# Patient Record
Sex: FEMALE | Employment: OTHER | URBAN - METROPOLITAN AREA
[De-identification: names, ages, dates, MRNs, and addresses within clinical notes are randomized per-mention and may not be internally consistent; named-entity substitution may affect disease eponyms.]

---

## 2018-09-24 ENCOUNTER — HOSPITAL ENCOUNTER (OUTPATIENT)
Dept: MAMMOGRAPHY | Age: 58
Discharge: HOME | End: 2018-09-24
Payer: COMMERCIAL

## 2018-09-24 DIAGNOSIS — Z12.39 SCREENING BREAST EXAMINATION: ICD-10-CM

## 2018-09-24 PROCEDURE — 77067 SCR MAMMO BI INCL CAD: CPT

## 2019-09-25 ENCOUNTER — HOSPITAL ENCOUNTER (OUTPATIENT)
Dept: MAMMOGRAPHY | Age: 59
Discharge: HOME | End: 2019-09-25
Payer: COMMERCIAL

## 2019-09-25 DIAGNOSIS — Z12.31 VISIT FOR SCREENING MAMMOGRAM: ICD-10-CM

## 2019-09-25 PROCEDURE — 77067 SCR MAMMO BI INCL CAD: CPT

## 2019-10-09 ENCOUNTER — HOSPITAL ENCOUNTER (OUTPATIENT)
Dept: MAMMOGRAPHY | Age: 59
Discharge: HOME | End: 2019-10-09

## 2019-10-09 DIAGNOSIS — R92.2 INCONCLUSIVE MAMMOGRAM: ICD-10-CM

## 2020-01-16 ENCOUNTER — HOSPITAL ENCOUNTER (OUTPATIENT)
Dept: PHYSICAL THERAPY | Age: 60
Discharge: HOME | End: 2020-01-16
Payer: COMMERCIAL

## 2020-01-16 PROCEDURE — 97161 PT EVAL LOW COMPLEX 20 MIN: CPT | Performed by: PHYSICAL THERAPIST

## 2020-01-16 NOTE — PROGRESS NOTES
[facility]  [address]  [place], [facility]  Phone: [phone]  Fax: [phone]    Plan of Care/Statement of Necessity for Physical Therapy Services  2-15    Patient name: Gigi Carroll  : 1960  Provider#: [id number]  Referral source: Jordana Quintero MD      Medical/Treatment Diagnosis: Right hip pain [M25.551]  Left hip pain [M25.552]  Pain in right knee [M25.561]  Left knee pain [M25.562]     Prior Hospitalization: see medical history     Comorbidities: see evaluation  Prior Level of Function:see evaluation  Medications: Verified on Patient Summary List  Start of Care: 2020     Onset Date:see evaluation   The Plan of Care and following information is based on the information from the initial evaluation. Assessment/ key information: Patient presents with signs and symptoms consistent with (R) lumbar radiculopathy + (R) hip impingement + francie knee OA and will benefit from physical therapy to address deficits noted below in problem list.     Problem List: pain affecting function, decrease ROM, decrease strength, edema affecting function, impaired gait/ balance, decrease ADL/ functional abilitiies and decrease flexibility/ joint mobility   Treatment Plan may include any combination of the following: Therapeutic exercise, Therapeutic activities, Neuromuscular re-education, Physical agent/modality, Manual therapy, Patient education and Self Care training  Patient / Family readiness to learn indicated by: asking questions, trying to perform skills and interest  Persons(s) to be included in education: patient (P)  Barriers to Learning/Limitations: None  Patient Goal (s): please see evaluation in [facility]  Patient Self Reported Health Status: please see paper chart  Rehabilitation Potential: excellent    Short Term Goals:  To be accomplished in 5 treatments:  -Independent in HEP as evidenced on ability to perform at least 5 exercises from HEP using proper form without verbal cuing.   -Pt will report knee swelling is 50% improved to allow pt to negotiate stairs with greater ease. -Pt will report she is compliant with cross training doing cardio/strength in conjunction with yoga 1-2 days weekly  -Pt will report compliance with icing 1-2x/day in order to decrease inflammation    Long Term Goals: To be accomplished in 10 treatments:  -AROM lumbar spine WNL and pain-free all planes to allow pt to perform household cleaning without pain  -Neg magdalena (R) hip to allow pt to resume all yoga poses without pain  -Swelling eliminated francie knees to allow pt to trial skiing.  -MMT greater than or equal to 4+/5 all planes to allow patient to perform ADL's  -Pain 0-2/10 to allow pt to help parents with household projects without pain    Frequency / Duration: Patient to be seen 2 times per week for 10-12 weeks. Patient/ Caregiver education and instruction: self care, activity modification and exercises    [x]  Plan of care has been reviewed with PTA    Certification Period: 1/16/2020 -  4/16/20    Beata Velázquez. Fei, PT, DPT, CMTPT      8/36/5044 18:93 AM  PT License Number: [id number]  _____________________________________________________________________    I certify that the above Therapy Services are being furnished while the patient is under my care. I agree with the treatment plan and certify that this therapy is necessary.     [de-identified] Signature:____________________  Date:____________Time:_________

## 2020-01-16 NOTE — PROGRESS NOTES
PT INITIAL EVALUATION NOTE - [facility] 2-15    Patient Name: Alexis Gonzalez  Date:2020  : 1960  [x]  Patient  Verified  Payor: [employer] / Plan: [place] [employer] / Product Type: HMO /    In time:955 A  Out time:1055 A  Total Treatment Time (min): 0 (eval only)  Visit #:1    Treatment Area: Right hip pain [M25.551]  Left hip pain [M25.552]  Pain in right knee [M25.561]  Left knee pain [M25.562]    SUBJECTIVE  Any medication changes, allergies to medications, adverse drug reactions, diagnosis change, or new procedure performed?: [] No    [x] Yes (see summary sheet for update)  Date of onset/injury: Pt with onset of francie knee pain that occurred about 1 year ago when pt was skiiing. She noticed increased swelling and pain in her right knee  After time passed, pain moved to her left knee  Then the right side of her back and francie hips started bothering her  Pt has been trying to stretch it out in yoga, but it just continues to limit her  Over the past year, her yoga practice has been more \"forward focused\" engaging core and hip flexors more, so she wonders if this is causing her to be more forward in her posture and overusing her knees. At times feels like francie knees are giving out on her. Have not given out on her   Pain:   4/10 max 0/10 min 2/10 now     Location of symptoms: right low back, francie ant aspect of hips, right ant knee, left ant knee  Description of symptoms: burning  Aggravated by: stairs, bending over, sleeping  Eased by: nothing  Prior tests/injections:none  PMH:  WNL  Any weakness in LE's: none  Any tingling/numbness in LE's: none  Recent weight/loss or weight gain: none  Prior tx: PT for LBP and hip pain  Occupation: retired  Social: has a partner, Mel Metcalf, who she lives with. Has two parents that live nearby that she helps with. Both Elderly 96 and 99. Prior level of function/activity level: very active. Does yoga 1 hr daily. Prior to injury, was able to do all yoga poses without limitation.   Was able to go up and down the stairs without pain and ski without pain  Patient goal: \"strength, stability, no surgical procedures\"    OBJECTIVE    Observation:  Lumbar shift  [] (L)   [x] (R)  Lordosis   [] Inc    [x] Dec  Pelvic symmetry  [] WNL  [x] Other: left posterior rotation illium    AROM  WNL unless noted below   % decreased   Flexion  WNL   Extension  10% pain right   Right Sidebending 10% pain right   Left Sidebending 10%   Right Rotation 10% pain right   Left Rotation better     AROM/PROM fidel hips WNL    AROM/PROM fidel knees WNL    Strength  *5/5 unless noted below    L(0-5) R (0-5)   Hip Flexion (L1,2)     Knee Extension (L3,4)     Knee Flexion (S1,2)     Ankle Dorsiflexion (L4)     Great Toe Extension (L5)     Ankle Plantarflexion (S1)     Ankle Eversion (S1)     Lower Abdominals 4 4   Paraspinals 4 4   Hip Extensors 4 4   Hip Abductors 4 4   Hip ER's 4 4   Hip IR's 4 4     Tenderness to palpation: (R) lumbar paraspinals, fidel glute max, glute med. Fidel vastus lateralis, VMO. Special Tests: (R) shift correction - right arm on wall (L) SB x 10 decreases pain with lumbar extension, improves lumbar extension    (R) hip (+) MICHAEL    All testing for fidel knees neg for meniscal and ligamentous pathology    fidel hips (-) compression, testing both hips improve symptoms    Flexibility: WNL    Joint mobility:  Hypomobility L1-L5 no pain with testing. Hypomobility noted femoral post glide fidel.    *OP of both feel better     Edema: mod noted right knee, min noted left knee       OBJECTIVE  [x] Skin assessment post-treatment:  [x]intact []redness- no adverse reaction    []redness  adverse reaction:      With   [x] eval   [] manual   [] self care    Patient Education: [x] Review HEP    [] Progressed/Changed HEP based on:   [] positioning   [] body mechanics   [] transfers   [x] Ice application- pt advised to ice 10-15 min 1-2 x/day to area in order to dec inflammation  [x] other:  re: mechanism of injury/condition, role of physical therapy, prognosis for recovery, heat vs ice, activity modifications. Education about disc mechanics using spine model. Pt to perform 10 repetitions of right arm on wall left SB q 1-2 hours. Pt advised re: centralization vs peripheralization. If symptoms centralize, pt to continue, if symptoms peripheralize, pt to stop. Pt educated on mechanics of spine. Pt advised to use lumbar roll for improved sitting posture. Discussion re: proper computer ergonomic set-up. Discussion re: safe transfer from supine to sit. Pt advised to add in prone press ups in a few days if feeling better. Pain Level (0-10 scale) post treatment: 2    ASSESSMENT/Changes in Function:     [x]  See [facility].  Fei PT, DPT, LEONARDAPT  PT License Number: [id number]   1/16/2020  10:07 AM

## 2020-01-22 ENCOUNTER — APPOINTMENT (OUTPATIENT)
Dept: PHYSICAL THERAPY | Age: 60
End: 2020-01-22

## 2020-01-22 ENCOUNTER — HOSPITAL ENCOUNTER (OUTPATIENT)
Dept: PHYSICAL THERAPY | Age: 60
Discharge: HOME | End: 2020-01-22
Payer: COMMERCIAL

## 2020-01-22 PROCEDURE — 20561 NDL INSJ W/O NJX 3+ MUSC: CPT | Performed by: PHYSICAL THERAPIST

## 2020-01-22 NOTE — PROGRESS NOTES
PHYSICAL THERAPY -DRY NEEDLING  DAILY TREATMENT NOTE    Patient Name: Bernardo Isbell        Date: 2020  : 1960   YES Patient  Verified  Visit #:   2   of     Insurance: Payor: Sharda Robb / Plan: Nico Yoder HMO / Product Type: HMO /      In time: 830 A  Out time: 195 A   Total Treatment Time:  45   Total Timed Codes:  45     *Note: for Medicare patients, dry needling NOT included in  time, only included in total treatment time. TREATMENT AREA: Right hip pain [M25.551]  Left hip pain [M25.552]  Pain in right knee [M25.561]  Left knee pain [M25.562]    SUBJECTIVE  Pain Level (on 0 to 10 scale):  2  / 10   Medication Changes/New allergies or changes in medical history, any new surgeries or procedures? NO    If yes, update Summary List   Subjective Functional Status/Changes:  []  No changes reported   \"I'm feeling so much better. I noticed less pain after, easier time on stairs, more opening. Decreased swelling. \"      OBJECTIVE    *Skin assessment post-treatment:    [x]  intact    [x]  redness- no adverse reaction     []redness  adverse reaction:          45 min Manual Therapy:    DN: [id number]  DN as noted in separate note below   Rationale:      decrease pain, increase ROM, increase tissue extensibility and decrease trigger points to improve patient's ability to perform ADL's. With MT  Patient Education:  YES  Reviewed HEP     Other Objective Measures:                Dry Needling Procedure Note    Dry Needle Session Number:  2      Procedure: An intramuscular manual therapy (dry needling) and a neuro-muscular re-education treatment was done to deactivate myofascial trigger points, with a solid filament needle, under aseptic technique.     Indication(s): [] Muscle spasms [] Headaches  [] TMJD      [] Muscle imbalances [x] Myofascial pain & dysfunction     [] Decreased ROM    TIMEOUT PERFORMED:    830 A (enter time the timeout was completed)   [facility] (enter who was present)    Informed Consent Obtained: [x] Verbal  [] Written    The following items were reviewed with the patient:  -Purpose of dry needling, side effects, possible complications, and the informed consent   -The need to report the use of blood thinners and/or immunosuppressant medications  -How to respond to possible adverse effects of the treatment  -Self treatment of post needling soreness: ice/heat, stretching, and activity modification.   -Opportunity was given to ask any questions, all questions were answered    Treatment:  The following muscles were treated today:  Fidel:  Right: .30 x 60 mm needles used to vastus lateralis, QL, glute med, glute max  Left:  *hemostasis applied after each needle was applied. Soft tissue mobilization to above areas     Patients response:   [x]  LTRs  []  Muscle Relaxation  [x]  Pain Relief   []  Decreased HAs [x]  Post-needling soreness []  Increased ROM      Post Treatment Pain Level (on 0 to 10) scale:   0  / 10   ASSESSMENT       [x]  See Progress Note/Recertification   Patient will continue to benefit from skilled PT services to modify and progress therapeutic interventions, address functional mobility deficits, address ROM deficits, address strength deficits, analyze and address soft tissue restrictions, analyze and modify body mechanics/ergonomics and instruct in home and community integration to attain remaining goals. Progress toward goals / Updated goals:       PLAN  []  Upgrade activities as tolerated YES Continue plan of care   []  Discharge due to :    []  Other:      Rodolfo Cardoza.  Fei PT, DPT, CMTPT  PT License Number: [id number]    1/22/2020

## 2020-02-03 ENCOUNTER — HOSPITAL ENCOUNTER (OUTPATIENT)
Dept: PHYSICAL THERAPY | Age: 60
Discharge: HOME | End: 2020-02-03
Payer: COMMERCIAL

## 2020-02-03 PROCEDURE — 97140 MANUAL THERAPY 1/> REGIONS: CPT | Performed by: PHYSICAL THERAPIST

## 2020-02-03 PROCEDURE — 20561 NDL INSJ W/O NJX 3+ MUSC: CPT | Performed by: PHYSICAL THERAPIST

## 2020-02-03 NOTE — PROGRESS NOTES
PHYSICAL THERAPY -DRY NEEDLING  DAILY TREATMENT NOTE    Patient Name: Gill Starkey        Date: 2/3/2020  : 1960   YES Patient  Verified  Visit #:   3  of     Insurance: Payor: Jordana Benavides / Plan: [employer] [place] HMO / Product Type: HMO /      In time: 110 P  Out time: 130 P   Total Treatment Time:  20   Total Timed Codes:  10     *Note: for Medicare patients, dry needling NOT included in  time, only included in total treatment time. TREATMENT AREA: Right hip pain [M25.551]  Left hip pain [M25.552]  Pain in right knee [M25.561]  Left knee pain [M25.562]    SUBJECTIVE  Pain Level (on 0 to 10 scale): 2   Medication Changes/New allergies or changes in medical history, any new surgeries or procedures? NO    If yes, update Summary List   Subjective Functional Status/Changes:  []  No changes reported   Patient reports that the dry needling in her quad has really helped her knee and made the swelling go down. Reports that she has some lateral R knee pain today as well as some intermittent low back/gluteal pain. She has been working on her HEP which is really helping. OBJECTIVE    *Skin assessment post-treatment:    [x]  intact    [x]  redness- no adverse reaction     []redness  adverse reaction:          8 min Manual Therapy:     STM R vastus lateralis, R TFL, R gluteal muscles    Rationale:      decrease pain, increase ROM, increase tissue extensibility and decrease trigger points to improve patient's ability to perform ADL's. With MT  Patient Education:  YES  Reviewed HEP     Other Objective Measures:                Dry Needling Procedure Note (12 min)     Dry Needle Session Number:  2      Procedure: An intramuscular manual therapy (dry needling) and a neuro-muscular re-education treatment was done to deactivate myofascial trigger points, with a solid filament needle, under aseptic technique.     Indication(s): [] Muscle spasms [] Headaches  [] TMJD      [] Muscle imbalances [x] Myofascial pain & dysfunction     [] Decreased ROM    TIMEOUT PERFORMED:    830 A (enter time the timeout was completed)   [employer] (enter who was present)    Informed Consent Obtained: [x] Verbal  [] Written    The following items were reviewed with the patient:  -Purpose of dry needling, side effects, possible complications, and the informed consent   -The need to report the use of blood thinners and/or immunosuppressant medications  -How to respond to possible adverse effects of the treatment  -Self treatment of post needling soreness: ice/heat, stretching, and activity modification.   -Opportunity was given to ask any questions, all questions were answered    Treatment:  The following muscles were treated today:  Fidel:  Right: .30 x 60 mm needles used to vastus lateralis, TFL, glute med, glute max  Left:  *hemostasis applied after each needle was applied. Soft tissue mobilization to above areas     Patients response:   [x]  LTRs  []  Muscle Relaxation  [x]  Pain Relief   []  Decreased HAs [x]  Post-needling soreness []  Increased ROM      Post Treatment Pain Level (on 0 to 10) scale:   0  / 10   ASSESSMENT  Patient tolerated dry needling well today. Good HEP compliance. [x]  See Progress Note/Recertification   Patient will continue to benefit from skilled PT services to modify and progress therapeutic interventions, address functional mobility deficits, address ROM deficits, address strength deficits, analyze and address soft tissue restrictions, analyze and modify body mechanics/ergonomics and instruct in home and community integration to attain remaining goals.    Progress toward goals / Updated goals:       PLAN  []  Upgrade activities as tolerated YES Continue plan of care   []  Discharge due to :    []  Other:      Antony Claudio, PT, DPT       2/3/2020

## 2020-02-06 ENCOUNTER — HOSPITAL ENCOUNTER (OUTPATIENT)
Dept: PHYSICAL THERAPY | Age: 60
Discharge: HOME | End: 2020-02-06
Payer: COMMERCIAL

## 2020-02-06 PROCEDURE — 20561 NDL INSJ W/O NJX 3+ MUSC: CPT | Performed by: PHYSICAL THERAPIST

## 2020-02-06 PROCEDURE — 97140 MANUAL THERAPY 1/> REGIONS: CPT | Performed by: PHYSICAL THERAPIST

## 2020-02-06 PROCEDURE — 97110 THERAPEUTIC EXERCISES: CPT | Performed by: PHYSICAL THERAPIST

## 2020-02-06 NOTE — PROGRESS NOTES
PHYSICAL THERAPY -DRY NEEDLING  DAILY TREATMENT NOTE    Patient Name: Corona Antony  Date:2020  : 1960  [x]  Patient  Verified  Payor: Unknown Ades / Plan: [place] [employer] / Product Type: HMO /    In time:345 P   Out time:430 P   Total Treatment Time (min): 45  Total Timed Codes (min): 25  Visit #:  Visit count could not be calculated. Make sure you are using a visit which is associated with an episode. Treatment Area: Right hip pain [M25.551]  Left hip pain [M25.552]  Pain in right knee [M25.561]  Left knee pain [M25.562]    SUBJECTIVE  Pain Level (on 0 to 10 scale):  1  / 10   Medication Changes/New allergies or changes in medical history, any new surgeries or procedures? NO    If yes, update Summary List   Subjective Functional Status/Changes:  []  No changes reported   Pt reports great improvement from dry needling. Feels like she has been able to go up and down the stairs without pain. OBJECTIVE  Modality:  10 min of ice used post-tx to decrease irritation, pain, and prevent soreness    *Skin assessment post-treatment:    [x]  intact    [x]  redness- no adverse reaction     []redness  adverse reaction:        10 min Therapeutic Exercise:  [x]  See flow sheet: discussion re: anterior pelvic tilt in standing and sitting. Rationale:      increase ROM, increase strength and improve functional mobility to improve the patients ability to perform ADL's    20 min  (untimed) []63770: 1-2 muscles  [x]: 3+ muscles   DN as noted in separate note below   Rationale:      decrease pain, increase ROM, increase tissue extensibility and decrease trigger points to improve patient's ability to perform ADL's.     15 min Manual Therapy:  [x]  See flow sheet: Soft tissue mobilization to above areas    Rationale:      increase ROM, increase strength and improve functional mobility to improve the patients ability to perform ADL's    With Manual  Patient Education:  YES  Reviewed HEP     Other Objective Measures:                Dry Needling Procedure Note    Procedure: An intramuscular manual therapy (dry needling) and a neuro-muscular re-education treatment was done to deactivate myofascial trigger points, with a solid filament needle, under aseptic technique. Indication(s): [] Muscle spasms [] Headaches  [] TMJD      [] Muscle imbalances [x] Myofascial pain & dysfunction     [] Decreased ROM    TIMEOUT PERFORMED:    345 P  (time the timeout was completed)   Ham Snyder (who was present)    Informed Consent Obtained: [x] Verbal  [] Written    The following items were reviewed with the patient:  -Purpose of dry needling, side effects, possible complications, and the informed consent   -The need to report the use of blood thinners and/or immunosuppressant medications  -How to respond to possible adverse effects of the treatment  -Self treatment of post needling soreness: ice/heat, stretching, and activity modification.   -Opportunity was given to ask any questions, all questions were answered    Treatment:  The following muscles were treated today:  Fidel:.30 x 50 mm needles used to multifidi L1-L5. Right:  Left:  *hemostasis applied after each needle was applied. Patients response:   [x]  LTRs  []  Muscle Relaxation  [x]  Pain Relief   []  Decreased HAs [x]  Post-needling soreness []  Increased ROM      Post Treatment Pain Level (on 0 to 10) scale:   0  / 10   ASSESSMENT       [x]  See Progress Note/Recertification   Patient will continue to benefit from skilled PT services to modify and progress therapeutic interventions, address functional mobility deficits, address ROM deficits, address strength deficits, analyze and address soft tissue restrictions, analyze and modify body mechanics/ergonomics and instruct in home and community integration to attain remaining goals.    Progress toward goals / Updated goals:       PLAN  []  Upgrade activities as tolerated YES Continue plan of care   []  Discharge due to :    []  Other:      Amanda Caraballo.  Fei PT, DPT, CMTPT  PT License Number: [id number]    2/6/2020

## 2020-03-02 ENCOUNTER — HOSPITAL ENCOUNTER (OUTPATIENT)
Dept: PHYSICAL THERAPY | Age: 60
Discharge: HOME | End: 2020-03-02
Payer: COMMERCIAL

## 2020-03-02 PROCEDURE — 97140 MANUAL THERAPY 1/> REGIONS: CPT | Performed by: PHYSICAL THERAPIST

## 2020-03-02 PROCEDURE — 20561 NDL INSJ W/O NJX 3+ MUSC: CPT | Performed by: PHYSICAL THERAPIST

## 2020-03-02 NOTE — PROGRESS NOTES
PHYSICAL THERAPY -DRY NEEDLING  DAILY TREATMENT NOTE    Patient Name: Corona Antony  Date:3/2/2020  : 1960  [x]  Patient  Verified  Payor: Unknown Ades / Plan: [place] [employer] / Product Type: HMO /    In time:1000 A   Out time:1040 A   Total Treatment Time (min): 40  Total Timed Codes (min): 15  Visit #:  5    Treatment Area: Right hip pain [M25.551]  Left hip pain [M25.552]  Pain in right knee [M25.561]  Left knee pain [M25.562]    SUBJECTIVE  Pain Level (on 0 to 10 scale): 0-1  / 10   Medication Changes/New allergies or changes in medical history, any new surgeries or procedures? NO    If yes, update Summary List   Subjective Functional Status/Changes:  []  No changes reported   Pt reports knee pain is gone, still with remaining low back pain if she bends over and is doing a lot of housework. OBJECTIVE      *Skin assessment post-treatment:    [x]  intact    [x]  redness- no adverse reaction     []redness  adverse reaction:          25 min  (untimed) []: 1-2 muscles  [x]: 3+ muscles   DN as noted in separate note below   Rationale:      decrease pain, increase ROM, increase tissue extensibility and decrease trigger points to improve patient's ability to perform ADL's. 15 min Manual Therapy:  [x]  See flow sheet: Soft tissue mobilization to above areas    Rationale:      increase ROM, increase strength and improve functional mobility to improve the patients ability to perform ADL's    With Manual  Patient Education:  YES  Reviewed HEP     Other Objective Measures:    AROM lumbar flexion 15% isidro, pain low back  TTP francie prox-medial HS  TTP lumbar paraspinals            Dry Needling Procedure Note    Procedure: An intramuscular manual therapy (dry needling) and a neuro-muscular re-education treatment was done to deactivate myofascial trigger points, with a solid filament needle, under aseptic technique.     Indication(s): [] Muscle spasms [] Headaches  [] TMJD      [] Muscle imbalances [x] Myofascial pain & dysfunction     [] Decreased ROM    TIMEOUT PERFORMED:    1000 A (time the timeout was completed)   Waylan Shone (who was present)    Informed Consent Obtained: [x] Verbal  [] Written    The following items were reviewed with the patient:  -Purpose of dry needling, side effects, possible complications, and the informed consent   -The need to report the use of blood thinners and/or immunosuppressant medications  -How to respond to possible adverse effects of the treatment  -Self treatment of post needling soreness: ice/heat, stretching, and activity modification.   -Opportunity was given to ask any questions, all questions were answered    Treatment:  The following muscles were treated today:  Fidel:.30 x 50 mm needles used to multifidi L1-L5, proximal-medial HS. Right:  Left:  *hemostasis applied after each needle was applied. Patients response:   [x]  LTRs  []  Muscle Relaxation  [x]  Pain Relief   []  Decreased HAs [x]  Post-needling soreness []  Increased ROM      Post Treatment Pain Level (on 0 to 10) scale:   0  / 10   ASSESSMENT       [x]  See Progress Note/Recertification   Patient will continue to benefit from skilled PT services to modify and progress therapeutic interventions, address functional mobility deficits, address ROM deficits, address strength deficits, analyze and address soft tissue restrictions, analyze and modify body mechanics/ergonomics and instruct in home and community integration to attain remaining goals. Progress toward goals / Updated goals:  AROM lumbar flexion WNL and pain-free post needling today. PLAN  []  Upgrade activities as tolerated YES Continue plan of care   []  Discharge due to :    []  Other:      Rodolfo Cardoza.  Fei PT, DPT, CMTPT  PT License Number: [id number]    3/2/2020

## 2020-10-07 ENCOUNTER — TRANSCRIBE ORDER (OUTPATIENT)
Dept: SCHEDULING | Age: 60
End: 2020-10-07

## 2020-10-07 DIAGNOSIS — Z12.31 VISIT FOR SCREENING MAMMOGRAM: Primary | ICD-10-CM

## 2020-10-09 ENCOUNTER — HOSPITAL ENCOUNTER (OUTPATIENT)
Dept: MAMMOGRAPHY | Age: 60
Discharge: HOME | End: 2020-10-09
Payer: COMMERCIAL

## 2020-10-09 DIAGNOSIS — Z12.31 VISIT FOR SCREENING MAMMOGRAM: ICD-10-CM

## 2020-10-09 PROCEDURE — 77067 SCR MAMMO BI INCL CAD: CPT

## 2021-03-23 ENCOUNTER — HOSPITAL ENCOUNTER (OUTPATIENT)
Dept: PHYSICAL THERAPY | Age: 61
Discharge: HOME | End: 2021-03-23
Payer: COMMERCIAL

## 2021-03-23 PROCEDURE — 97161 PT EVAL LOW COMPLEX 20 MIN: CPT | Performed by: PHYSICAL THERAPIST

## 2021-03-23 PROCEDURE — 20560 NDL INSJ W/O NJX 1 OR 2 MUSC: CPT | Performed by: PHYSICAL THERAPIST

## 2021-03-23 PROCEDURE — 97140 MANUAL THERAPY 1/> REGIONS: CPT | Performed by: PHYSICAL THERAPIST

## 2021-03-23 NOTE — PROGRESS NOTES
PT INITIAL EVALUATION NOTE - [facility] 2-15    Patient Name: Ambrose Chadwick  Date:3/23/2021  : 1960  [x]  Patient  Verified  Payor: Nestor Nava / Plan: [address] [place] HMO / Product Type: HMO /    In time:1030 A   Out time:1145A  Total Treatment Time (min): 75 (60 eval, 15 timed, 10 modality see below)  Total Timed Codes (min): 15   1:1 Treatment Time ([place]): 15    Visit #:1    Treatment Area: Bilateral ankle pain [M25.571, M25.572]    SUBJECTIVE  Any medication changes, allergies to medications, adverse drug reactions, diagnosis change, or new procedure performed?: [] No    [x] Yes (see summary sheet for update)    Current symptoms/chief complaint:   Pt with onset of left heel pain end of 2021  Pt reports it may be from increased time walking on the beach  Seems to have come up overnight   Has had history of extensive Ashtanga yoga practice and landing on heels. Pain:   7/10 max 3/10 min 4/10 now     Aggravated by: walking, movement  Eased by: stillness  Location and description of symptoms: left heel  Any tingling/numbness in LE: none  Shoewear/orthotics: wears supportive sneakers/flats daily. No heels. Previous treatment: PT for left LE issues (see prior connect care)  Tests/injections: none  Occupation: none  Prior level of function/activity level: Prior,was able to walk with no discomfort. Patient goal:\" eliminate pain\"  PMH: WNL  Social: lives with Partner. [place] full time now, comes up frequently to [place] to visit her father.      OBJECTIVE      Observation: Large irene's deformity left heel    Gait: dec step length francie, dec push             AROM/PROM WNL left ankle    MMT: 5/5 left ankle all planes     Tenderness to palpation: left achilles, left gastroc    Functional Biomechanical Screen    SLS on L:30  Sec pain    Joint Mobility: dec rearfoot mobility inv/eversion        OBJECTIVE  [x] Skin assessment post-treatment:  [x]intact []redness- no adverse reaction    []redness  adverse reaction: 15 min Manual Therapy: STM to gastroc and soleus and achilles. Added donut support to left heel over irene deformity with cover-all on top. Rationale: decrease pain, increase ROM, increase tissue extensibility and decrease trigger points to improve the patients ability to walk. 15   min Dry Needling     (not to be included in total timed codes or 1:1 Treatment Time)     10 min [x]  Ice     []  Heat  Position:supine, francie LE's supported  Location: left heel and gastroc   Rationale: decrease edema, decrease inflammation, decrease pain and reduce soreness post therapy in order to improve the patients ability to perform ADL's. Billing:  [x]  [id number] (1-2 muscles)         []  [id number] (3+ muscles)           [x]  Script obtained from MD specifying \"Dry Needling\"     Written Informed Consent Obtained and Document Included in Chart: NO    Procedure: Intramsuclar Dry Needling was done with a .20 x 30 mm needle used to achilles, the rest is . 30 x 50 mm needles to the rest gauge solid monofilament needle,                       under aseptic technique      Rationale/Necessity: Decrease pain, Increase ROM, Increase/Improve Function and Increase Tissue Extensibility    Muscles Treated:  []  Bilateral:                                  []  Right:                                 [x]  Left:gastroc                           []  Hemostasis applied after each needle application     Response: Increased Tolerance to Exercise/Activity, Improve Soft Tissue Mobility and Post Needle Soreness    Education: Purpose or rationale of dry needling                      Side effects and possible adverse effects of the treatment                      The need to report the use of blood thinners and/or immunosupressant medications                      How to respond to possible adverse effects of the treatment                       Self-treatment of post needling soreness (ice/heat, stretching, activity modification) Opportunity was given to ask questions; all questions were answered            With   [] TE   [x] manual   [] self care    Patient Education: [x] Review HEP    [] Progressed/Changed HEP based on:   [] positioning   [] body mechanics   [] transfers   [x] Ice application- pt advised to ice 10-15 min 1-2 x/day to area in order to dec inflammation  [x] other:  re: mechanism of injury/condition, role of physical therapy, prognosis for recovery, heat vs ice, activity modification-trial of shoe with heel down. Pain Level (0-10 scale) post treatment: 3    ASSESSMENT/Changes in Function:     [x]  See Plan of Gabe.  Fei PT, DPT, CMTPT  PT License Number: [id number]   3/23/2021  10:31 AM

## 2021-03-23 NOTE — PROGRESS NOTES
[facility]  [address]  [place], [address] [place] [address]  Phone: [phone]  Fax: [phone]    Plan of Care/Statement of Necessity for Physical Therapy Services  2-15    Patient name: Dania Scales  : 1960  Provider#: [id number]  Referral source: Self Referred. Medical/Treatment Diagnosis: Bilateral ankle pain [M25.571, M25.572]     Prior Hospitalization: see medical history     Comorbidities: see evaluation  Prior Level of Function:see evaluation  Medications: Verified on Patient Summary List  Start of Care: 3/23/2021     Onset Date:see evaluation     The Plan of Care and following information is based on the information from the initial evaluation.     Assessment/ key information: Patient presents with signs and symptoms consistent with left heel pain and will benefit from physical therapy to address deficits noted below in problem list.   Evaluation Complexity History LOW Complexity : Zero comorbidities / personal factors that will impact the outcome / POC; Examination LOW Complexity : 1-2 Standardized tests and measures addressing body structure, function, activity limitation and / or participation in recreation  ;Presentation LOW Complexity : Stable, uncomplicated  ;Clinical Decision Making Other outcome measures clinical judgment  LOW   Overall Complexity Rating: LOW   Problem List: pain affecting function, decrease ROM, decrease ADL/ functional abilitiies, decrease activity tolerance and decrease flexibility/ joint mobility   Treatment Plan may include any combination of the following: Therapeutic exercise, Therapeutic activities, Neuromuscular re-education, Physical agent/modality, Manual therapy, Patient education and Self Care training  Patient / Family readiness to learn indicated by: asking questions, trying to perform skills and interest  Persons(s) to be included in education: patient (P)  Barriers to Learning/Limitations: None  Patient Goal (s): please see evaluation in [facility]  Patient Self Reported Health Status: please see paper chart  Rehabilitation Potential: excellent    Short Term Goals: To be accomplished in 5 treatments:  -Independent in HEP as evidenced on ability to perform at least 5 exercises from HEP using proper form without verbal cuing.   -Pain less than or equal to 7/10 at worst to allow patient to perform ADL's with greater ease  -Pt will report compliance with icing 1-2x/day in order to decrease inflammation    Long Term Goals: To be accomplished in 3 months:  -Pt will be able to walk on the beach without pain  -Pain 0/10 to allow patient to sleep without pain    Frequency / Duration: Patient to be seen 1-2 times per week for 3 months. Patient/ Caregiver education and instruction: self care, activity modification and exercises    [x]  Plan of care has been reviewed with PTA    Certification Period: 3/23/2021 -  6/22/21    Mariel Qureshi. Fei PT, DPT, CMTPT      5/65/0494 78:54 AM  PT License Number: [id number]  _____________________________________________________________________    I certify that the above Therapy Services are being furnished while the patient is under my care. I agree with the treatment plan and certify that this therapy is necessary.     [de-identified] Signature:____________________  Date:____________Time:_________

## 2021-03-25 ENCOUNTER — HOSPITAL ENCOUNTER (OUTPATIENT)
Dept: PHYSICAL THERAPY | Age: 61
Discharge: HOME | End: 2021-03-25
Payer: COMMERCIAL

## 2021-03-25 PROCEDURE — 97140 MANUAL THERAPY 1/> REGIONS: CPT | Performed by: PHYSICAL THERAPIST

## 2021-03-25 PROCEDURE — 20560 NDL INSJ W/O NJX 1 OR 2 MUSC: CPT | Performed by: PHYSICAL THERAPIST

## 2021-03-25 NOTE — PROGRESS NOTES
PT DAILY TREATMENT NOTE - [facility] 2-15    Patient Name: Nirmala Hall  Date:3/25/2021  : 1960  [x]  Patient  Verified  Payor: Cornelia Pollard / Plan: [employer] / Product Type: HMO /    In time:1200 P  Out time:100 P  Total Treatment Time (min): 60  Total Timed Codes (min): 60  Visit #:  2    Treatment Area: Bilateral ankle pain [M25.571, M25.572]    SUBJECTIVE  Pain Level (0-10 scale): 2  Any medication changes, allergies to medications, adverse drug reactions, diagnosis change, or new procedure performed?: [x] No    [] Yes (see summary sheet for update)  Subjective functional status/changes:     Pt reports she is much better. OBJECTIVE      [x] Skin assessment post-treatment:  [x]intact []redness- no adverse reaction    []redness  adverse reaction:         30 min Manual Therapy: STM to gastroc and soleus and achilles.        Rearfoot mobs-inversion and eversion. Talocrural mobs-distraction and post glide. René Saxena PROM ankle all planes. First ray mobs. Passive stretching gastrocs. Rationale: decrease pain, increase ROM, increase tissue extensibility and decrease trigger points to improve the patients ability to walk.     30    min Dry Needling     (not to be included in total timed codes or 1:1 Treatment Time)      Rationale: decrease edema, decrease inflammation, decrease pain and reduce soreness post therapy in order to improve the patients ability to perform ADL's.     Billing:  [x]?  [id number] (1-2 muscles)         []?  [id number] (3+ muscles)             [x]? Script obtained from MD specifying \"Dry Needling\"     · Written Informed Consent Obtained and Document Included in Chart: NO     Procedure: Intramsuclar Dry Needling was done with a .20 x 30 mm needle used to achilles, the rest is . 30 x 50 mm needles to the rest gauge solid monofilament needle,                       under aseptic technique       Rationale/Necessity: Decrease pain, Increase ROM, Increase/Improve Function and Increase Tissue Extensibility     Muscles Treated:  []? Bilateral:                                  []?  Right:                                 [x]? Left:gastroc                           []?  Hemostasis applied after each needle application      Response: Increased Tolerance to Exercise/Activity, Improve Soft Tissue Mobility and Post Needle Soreness     Education: Purpose or rationale of dry needling                      Side effects and possible adverse effects of the treatment                      The need to report the use of blood thinners and/or immunosupressant medications                      How to respond to possible adverse effects of the treatment                       Self-treatment of post needling soreness (ice/heat, stretching, activity modification)                      Opportunity was given to ask questions; all questions were answered               With   [] TE   [x] manual Patient Education: [x] Review HEP    [] Progressed/Changed HEP based on:   [] positioning   [] body mechanics   [] transfers   [] heat/ice application    [x] other: advised on ex's to do to strengthen-calf raises and progression. SLS and progression, calf raise and progression. Other Objective/Functional Measures:        Pain Level (0-10 scale) post treatment: 1    ASSESSMENT/Changes in Function:     Patient will continue to benefit from skilled PT services to modify and progress therapeutic interventions, address functional mobility deficits, address strength deficits, analyze and address soft tissue restrictions, analyze and cue movement patterns, and analyze and modify body mechanics/ergonomics to attain remaining goals. Progress towards goals / Updated goals:  Pt with improved soft tissue mobility and improved flexibility of gastroc musculature post needling. Tolerated tx well.      PLAN  []  Upgrade activities as tolerated     []  Continue plan of care  []  Update interventions per flow sheet       []  Discharge due to:_  [x]  Other:_  Pt to return back to [facility]. Will follow back up in 1 mo. Pierre Hernandez.  Fei, JORDIN 3/25/2021

## 2021-10-05 ENCOUNTER — TRANSCRIBE ORDER (OUTPATIENT)
Dept: SCHEDULING | Age: 61
End: 2021-10-05

## 2021-10-05 DIAGNOSIS — Z12.31 SCREENING MAMMOGRAM FOR HIGH-RISK PATIENT: Primary | ICD-10-CM

## 2021-10-19 ENCOUNTER — HOSPITAL ENCOUNTER (OUTPATIENT)
Dept: MAMMOGRAPHY | Age: 61
Discharge: HOME | End: 2021-10-19
Payer: COMMERCIAL

## 2021-10-19 DIAGNOSIS — Z12.31 SCREENING MAMMOGRAM FOR HIGH-RISK PATIENT: ICD-10-CM

## 2021-10-19 PROCEDURE — 77063 BREAST TOMOSYNTHESIS BI: CPT
